# Patient Record
Sex: FEMALE | Race: OTHER | NOT HISPANIC OR LATINO
[De-identification: names, ages, dates, MRNs, and addresses within clinical notes are randomized per-mention and may not be internally consistent; named-entity substitution may affect disease eponyms.]

---
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
## 2022-11-23 ENCOUNTER — OFFICE (OUTPATIENT)
Dept: URBAN - METROPOLITAN AREA CLINIC 113 | Facility: CLINIC | Age: 22
Setting detail: OPHTHALMOLOGY
End: 2022-11-23
Payer: COMMERCIAL

## 2022-11-23 DIAGNOSIS — H52.13: ICD-10-CM

## 2022-11-23 DIAGNOSIS — Z01.00: ICD-10-CM

## 2022-11-23 PROCEDURE — 92310 CONTACT LENS FITTING OU: CPT | Performed by: OPTOMETRIST

## 2022-11-23 PROCEDURE — 92014 COMPRE OPH EXAM EST PT 1/>: CPT | Performed by: OPTOMETRIST

## 2022-11-23 ASSESSMENT — REFRACTION_MANIFEST
OD_VA1: 20/20
OD_CYLINDER: -0.75
OS_CYLINDER: -0.75
OD_AXIS: 105
OS_CYLINDER: -0.50
OD_AXIS: 105
OD_SPHERE: -2.00
OS_SPHERE: -2.00
OS_VA1: 20/20
OS_AXIS: 090
OS_VA1: 20/20
OD_SPHERE: -2.00
OS_SPHERE: -2.00
OD_CYLINDER: -0.50
OS_AXIS: 090
OD_VA1: 20/20

## 2022-11-23 ASSESSMENT — REFRACTION_CURRENTRX
OD_VPRISM_DIRECTION: SV
OD_OVR_VA: 20/
OS_VPRISM_DIRECTION: SV
OD_SPHERE: -2.00
OS_OVR_VA: 20/
OS_AXIS: 075
OD_CYLINDER: SPHERE
OS_CYLINDER: -0.50
OS_SPHERE: -1.50
OD_AXIS: 180

## 2022-11-23 ASSESSMENT — VISUAL ACUITY
OD_BCVA: 20/20
OS_BCVA: 20/20

## 2022-11-23 ASSESSMENT — REFRACTION_AUTOREFRACTION
OS_CYLINDER: -0.75
OS_AXIS: 087
OD_SPHERE: -1.75
OS_SPHERE: -2.00
OD_CYLINDER: -0.50
OD_AXIS: 103

## 2022-11-23 ASSESSMENT — KERATOMETRY
OD_K2POWER_DIOPTERS: 41.25
OS_AXISANGLE_DEGREES: 090
OD_K1POWER_DIOPTERS: 41.00
OS_K1POWER_DIOPTERS: 41.25
OS_K2POWER_DIOPTERS: 41.25
OD_AXISANGLE_DEGREES: 060

## 2022-11-23 ASSESSMENT — AXIALLENGTH_DERIVED
OS_AL: 25.4275
OS_AL: 25.4842
OD_AL: 25.4809
OD_AL: 25.3678
OS_AL: 25.4842
OD_AL: 25.5379

## 2022-11-23 ASSESSMENT — SPHEQUIV_DERIVED
OS_SPHEQUIV: -2.25
OD_SPHEQUIV: -2.25
OD_SPHEQUIV: -2
OS_SPHEQUIV: -2.375
OS_SPHEQUIV: -2.375
OD_SPHEQUIV: -2.375

## 2022-11-23 ASSESSMENT — TONOMETRY
OS_IOP_MMHG: 16
OD_IOP_MMHG: 14

## 2022-11-23 ASSESSMENT — CONFRONTATIONAL VISUAL FIELD TEST (CVF)
OS_FINDINGS: FULL
OD_FINDINGS: FULL

## 2022-12-28 ENCOUNTER — NON-APPOINTMENT (OUTPATIENT)
Age: 22
End: 2022-12-28

## 2023-02-14 ENCOUNTER — OFFICE (OUTPATIENT)
Dept: URBAN - METROPOLITAN AREA CLINIC 113 | Facility: CLINIC | Age: 23
Setting detail: OPHTHALMOLOGY
End: 2023-02-14
Payer: COMMERCIAL

## 2023-02-14 DIAGNOSIS — H52.13: ICD-10-CM

## 2023-02-14 PROCEDURE — N/C NO CHARGE: Performed by: OPTOMETRIST

## 2023-02-14 ASSESSMENT — REFRACTION_CURRENTRX
OS_OVR_VA: 20/
OS_CYLINDER: -0.50
OD_SPHERE: -2.00
OD_AXIS: 180
OD_CYLINDER: SPHERE
OS_VPRISM_DIRECTION: SV
OS_AXIS: 075
OS_SPHERE: -1.50
OD_OVR_VA: 20/
OD_VPRISM_DIRECTION: SV

## 2023-02-14 ASSESSMENT — AXIALLENGTH_DERIVED
OS_AL: 25.4842
OS_AL: 25.4275
OD_AL: 25.4809
OD_AL: 25.3678
OD_AL: 25.5379
OS_AL: 25.4842

## 2023-02-14 ASSESSMENT — REFRACTION_MANIFEST
OS_VA1: 20/20
OS_CYLINDER: -0.50
OS_VA1: 20/20
OD_VA1: 20/20
OD_AXIS: 105
OD_VA1: 20/20
OD_AXIS: 105
OS_AXIS: 090
OS_CYLINDER: -0.75
OS_AXIS: 090
OD_CYLINDER: -0.75
OD_CYLINDER: -0.50
OS_SPHERE: -2.00
OD_SPHERE: -2.00
OS_SPHERE: -2.00
OD_SPHERE: -2.00

## 2023-02-14 ASSESSMENT — VISUAL ACUITY
OS_BCVA: 20/20
OD_BCVA: 20/20

## 2023-02-14 ASSESSMENT — REFRACTION_AUTOREFRACTION
OS_CYLINDER: -0.75
OD_SPHERE: -1.75
OS_SPHERE: -2.00
OD_CYLINDER: -0.50
OS_AXIS: 087
OD_AXIS: 103

## 2023-02-14 ASSESSMENT — SPHEQUIV_DERIVED
OS_SPHEQUIV: -2.375
OD_SPHEQUIV: -2
OS_SPHEQUIV: -2.25
OD_SPHEQUIV: -2.25
OS_SPHEQUIV: -2.375
OD_SPHEQUIV: -2.375

## 2023-02-14 ASSESSMENT — KERATOMETRY
OD_K2POWER_DIOPTERS: 41.25
OD_AXISANGLE_DEGREES: 060
OS_K1POWER_DIOPTERS: 41.25
OS_AXISANGLE_DEGREES: 090
OD_K1POWER_DIOPTERS: 41.00
OS_K2POWER_DIOPTERS: 41.25

## 2023-11-25 ENCOUNTER — OFFICE (OUTPATIENT)
Dept: URBAN - METROPOLITAN AREA CLINIC 113 | Facility: CLINIC | Age: 23
Setting detail: OPHTHALMOLOGY
End: 2023-11-25
Payer: COMMERCIAL

## 2023-11-25 DIAGNOSIS — Z01.00: ICD-10-CM

## 2023-11-25 DIAGNOSIS — H52.13: ICD-10-CM

## 2023-11-25 PROCEDURE — CLRNW CONTACT LENS RENEWAL- NO LENS CHANGE: Performed by: OPTOMETRIST

## 2023-11-25 PROCEDURE — 92014 COMPRE OPH EXAM EST PT 1/>: CPT | Performed by: OPTOMETRIST

## 2023-11-25 ASSESSMENT — REFRACTION_MANIFEST
OS_AXIS: 080
OD_CYLINDER: -0.50
OD_AXIS: 100
OD_VA1: 20/20
OD_SPHERE: -2.00
OD_VA1: 20/20
OS_SPHERE: -2.00
OS_CYLINDER: -0.50
OD_CYLINDER: -0.50
OD_AXIS: 100
OD_SPHERE: -2.25
OS_VA1: 20/20
OS_VA1: 20/20
OS_AXIS: 080
OS_SPHERE: -2.00
OS_CYLINDER: -0.50

## 2023-11-25 ASSESSMENT — REFRACTION_CURRENTRX
OD_VPRISM_DIRECTION: SV
OS_CYLINDER: -0.50
OS_VPRISM_DIRECTION: SV
OD_AXIS: 180
OD_SPHERE: -2.00
OS_SPHERE: -1.50
OS_OVR_VA: 20/
OD_OVR_VA: 20/
OS_AXIS: 075
OD_CYLINDER: SPHERE

## 2023-11-25 ASSESSMENT — CONFRONTATIONAL VISUAL FIELD TEST (CVF)
OD_FINDINGS: FULL
OS_FINDINGS: FULL

## 2023-11-25 ASSESSMENT — SPHEQUIV_DERIVED
OS_SPHEQUIV: -2.25
OS_SPHEQUIV: -2.25
OS_SPHEQUIV: -2.625
OD_SPHEQUIV: -2.5
OD_SPHEQUIV: -2.25
OD_SPHEQUIV: -2.5

## 2023-11-25 ASSESSMENT — REFRACTION_AUTOREFRACTION
OS_CYLINDER: -0.75
OS_SPHERE: -2.25
OD_CYLINDER: -0.50
OD_AXIS: 099
OD_SPHERE: -2.25
OS_AXIS: 079

## 2024-11-29 ENCOUNTER — APPOINTMENT (OUTPATIENT)
Dept: MRI IMAGING | Facility: CLINIC | Age: 24
End: 2024-11-29

## 2024-12-20 ENCOUNTER — APPOINTMENT (OUTPATIENT)
Dept: MRI IMAGING | Facility: CLINIC | Age: 24
End: 2024-12-20
Payer: COMMERCIAL

## 2024-12-20 PROCEDURE — 70551 MRI BRAIN STEM W/O DYE: CPT

## 2025-07-07 ENCOUNTER — OUTPATIENT (OUTPATIENT)
Dept: OUTPATIENT SERVICES | Facility: HOSPITAL | Age: 25
LOS: 1 days | End: 2025-07-07
Payer: COMMERCIAL

## 2025-07-07 ENCOUNTER — APPOINTMENT (OUTPATIENT)
Dept: RADIOLOGY | Facility: CLINIC | Age: 25
End: 2025-07-07
Payer: COMMERCIAL

## 2025-07-07 DIAGNOSIS — Z00.8 ENCOUNTER FOR OTHER GENERAL EXAMINATION: ICD-10-CM

## 2025-07-07 PROCEDURE — 73600 X-RAY EXAM OF ANKLE: CPT | Mod: 26,50

## 2025-07-07 PROCEDURE — 73600 X-RAY EXAM OF ANKLE: CPT

## 2025-07-11 ENCOUNTER — APPOINTMENT (OUTPATIENT)
Dept: NEUROLOGY | Facility: CLINIC | Age: 25
End: 2025-07-11
Payer: COMMERCIAL

## 2025-07-11 VITALS
DIASTOLIC BLOOD PRESSURE: 62 MMHG | WEIGHT: 120 LBS | HEIGHT: 60 IN | HEART RATE: 69 BPM | BODY MASS INDEX: 23.56 KG/M2 | SYSTOLIC BLOOD PRESSURE: 98 MMHG | OXYGEN SATURATION: 98 %

## 2025-07-11 PROBLEM — Z78.9 SOCIAL ALCOHOL USE: Status: ACTIVE | Noted: 2025-07-11

## 2025-07-11 PROBLEM — Z78.9 DOES NOT USE ILLICIT DRUGS: Status: ACTIVE | Noted: 2025-07-11

## 2025-07-11 PROCEDURE — 99203 OFFICE O/P NEW LOW 30 MIN: CPT

## 2025-07-11 RX ORDER — GALCANEZUMAB 120 MG/ML
120 INJECTION, SOLUTION SUBCUTANEOUS
Qty: 2 | Refills: 0 | Status: ACTIVE | COMMUNITY
Start: 2025-07-11 | End: 1900-01-01

## 2025-07-11 RX ORDER — ISOTRETINOIN 40 MG/1
40 CAPSULE ORAL
Refills: 0 | Status: ACTIVE | COMMUNITY

## 2025-07-18 RX ORDER — GALCANEZUMAB 120 MG/ML
120 INJECTION, SOLUTION SUBCUTANEOUS
Qty: 1 | Refills: 4 | Status: ACTIVE | COMMUNITY
Start: 2025-07-11

## 2025-07-22 ENCOUNTER — APPOINTMENT (OUTPATIENT)
Dept: DERMATOLOGY | Facility: CLINIC | Age: 25
End: 2025-07-22
Payer: COMMERCIAL

## 2025-07-22 PROCEDURE — 99204 OFFICE O/P NEW MOD 45 MIN: CPT

## 2025-08-04 DIAGNOSIS — G43.109 MIGRAINE WITH AURA, NOT INTRACTABLE, W/OUT STATUS MIGRAINOSUS: ICD-10-CM

## 2025-08-04 RX ORDER — RIMEGEPANT SULFATE 75 MG/75MG
75 TABLET, ORALLY DISINTEGRATING ORAL
Qty: 16 | Refills: 5 | Status: ACTIVE | COMMUNITY
Start: 2025-08-04 | End: 1900-01-01

## 2025-08-04 RX ORDER — NORTRIPTYLINE HYDROCHLORIDE 10 MG/1
10 CAPSULE ORAL
Qty: 60 | Refills: 2 | Status: ACTIVE | COMMUNITY
Start: 2025-08-04 | End: 1900-01-01

## 2025-09-12 ENCOUNTER — APPOINTMENT (OUTPATIENT)
Dept: NEUROLOGY | Facility: CLINIC | Age: 25
End: 2025-09-12
Payer: COMMERCIAL

## 2025-09-12 ENCOUNTER — NON-APPOINTMENT (OUTPATIENT)
Age: 25
End: 2025-09-12

## 2025-09-12 VITALS
HEIGHT: 60 IN | DIASTOLIC BLOOD PRESSURE: 62 MMHG | WEIGHT: 120 LBS | SYSTOLIC BLOOD PRESSURE: 106 MMHG | BODY MASS INDEX: 23.56 KG/M2 | OXYGEN SATURATION: 99 % | HEART RATE: 66 BPM

## 2025-09-12 DIAGNOSIS — G43.109 MIGRAINE WITH AURA, NOT INTRACTABLE, W/OUT STATUS MIGRAINOSUS: ICD-10-CM

## 2025-09-12 PROCEDURE — 99203 OFFICE O/P NEW LOW 30 MIN: CPT
